# Patient Record
Sex: MALE | URBAN - METROPOLITAN AREA
[De-identification: names, ages, dates, MRNs, and addresses within clinical notes are randomized per-mention and may not be internally consistent; named-entity substitution may affect disease eponyms.]

---

## 2021-04-07 ENCOUNTER — OFFICE VISIT (OUTPATIENT)
Dept: URBAN - METROPOLITAN AREA CLINIC 75 | Facility: CLINIC | Age: 71
End: 2021-04-07
Payer: COMMERCIAL

## 2021-04-07 DIAGNOSIS — H40.213 ACUTE ANGLE-CLOSURE GLAUCOMA, BILATERAL: ICD-10-CM

## 2021-04-07 DIAGNOSIS — H25.813 COMBINED FORMS OF AGE-RELATED CATARACT, BILATERAL: Primary | ICD-10-CM

## 2021-04-07 DIAGNOSIS — H35.033 HYPERTENSIVE RETINOPATHY, BILATERAL: ICD-10-CM

## 2021-04-07 PROCEDURE — 99204 OFFICE O/P NEW MOD 45 MIN: CPT | Performed by: OPTOMETRIST

## 2021-04-07 ASSESSMENT — VISUAL ACUITY
OD: 20/80
OS: 20/150

## 2021-04-07 ASSESSMENT — INTRAOCULAR PRESSURE
OD: 12
OS: 13

## 2021-04-07 NOTE — IMPRESSION/PLAN
Impression: Combined forms of age-related cataract, bilateral: H25.813-Cataracts account for the patient's complaints. Good-moderate dilator. No retinal clearance needed. Plan:   Discussed all risks, benefits, procedures and recovery. Patient understands changing glasses will not improve vision. Patient desires to have surgery.

## 2021-04-07 NOTE — IMPRESSION/PLAN
Impression: Acute angle-closure glaucoma, bilateral: H40.213. Plan: Discussed diagnosis in detail with patient. Will continue to observe condition and or symptoms.

## 2021-05-20 ENCOUNTER — PRE-OPERATIVE VISIT (OUTPATIENT)
Dept: URBAN - METROPOLITAN AREA CLINIC 71 | Facility: CLINIC | Age: 71
End: 2021-05-20
Payer: COMMERCIAL

## 2021-05-20 DIAGNOSIS — H40.033 ANATOMICAL NARROW ANGLE, BILATERAL: ICD-10-CM

## 2021-05-20 DIAGNOSIS — H25.812 COMBINED FORMS OF AGE-RELATED CATARACT, LEFT EYE: ICD-10-CM

## 2021-05-20 PROCEDURE — 92014 COMPRE OPH EXAM EST PT 1/>: CPT | Performed by: OPHTHALMOLOGY

## 2021-05-20 PROCEDURE — 76519 ECHO EXAM OF EYE: CPT | Performed by: OPHTHALMOLOGY

## 2021-05-20 NOTE — IMPRESSION/PLAN
Impression: Combined forms of age-related cataract, bilateral: H25.813. Plan: Discussed diagnosis in detail with patient. Surgical treatment is necessary to improve vision. Patient elects to have surgery. Surgical risks and benefits were discussed, explained and understood by patient. Lid scrubs and hygiene were explained. Pre op instructions given and understood. Post op instructions given and understood. 

PROCEED WITH BASIC TRADITIONAL CATARACTS SURGERY FOR DISTANCE, RX DROPS, CS VIDEO DONE

## 2021-06-15 ENCOUNTER — SURGERY (OUTPATIENT)
Dept: URBAN - METROPOLITAN AREA SURGERY 45 | Facility: SURGERY | Age: 71
End: 2021-06-15
Payer: COMMERCIAL

## 2021-06-15 ENCOUNTER — SURGERY (OUTPATIENT)
Dept: URBAN - METROPOLITAN AREA SURGERY 44 | Facility: SURGERY | Age: 71
End: 2021-06-15
Payer: COMMERCIAL

## 2021-06-15 PROCEDURE — 66984 XCAPSL CTRC RMVL W/O ECP: CPT | Performed by: OPHTHALMOLOGY

## 2021-06-16 ENCOUNTER — POST-OPERATIVE VISIT (OUTPATIENT)
Dept: URBAN - METROPOLITAN AREA CLINIC 71 | Facility: CLINIC | Age: 71
End: 2021-06-16
Payer: COMMERCIAL

## 2021-06-16 DIAGNOSIS — Z48.810 ENCOUNTER FOR SURGICAL AFTERCARE FOLLOWING SURGERY ON A SENSE ORGAN: Primary | ICD-10-CM

## 2021-06-16 PROCEDURE — 99024 POSTOP FOLLOW-UP VISIT: CPT | Performed by: OPHTHALMOLOGY

## 2021-06-16 ASSESSMENT — INTRAOCULAR PRESSURE
OD: 15
OS: 10

## 2021-06-16 NOTE — IMPRESSION/PLAN
Impression: S/P Cataract Extraction by phacoemulsification with IOL placement OD - 1 Day. Encounter for surgical aftercare following surgery on a sense organ  Z48.810. Plan: IOL in place, healing well. Ok to proceed with Left eye, Order for surgery.

## 2021-06-25 ENCOUNTER — SURGERY (OUTPATIENT)
Dept: URBAN - METROPOLITAN AREA SURGERY 44 | Facility: SURGERY | Age: 71
End: 2021-06-25
Payer: COMMERCIAL

## 2021-06-25 PROCEDURE — 66984 XCAPSL CTRC RMVL W/O ECP: CPT | Performed by: OPHTHALMOLOGY

## 2021-06-29 ENCOUNTER — SURGERY (OUTPATIENT)
Dept: URBAN - METROPOLITAN AREA SURGERY 45 | Facility: SURGERY | Age: 71
End: 2021-06-29
Payer: COMMERCIAL

## 2021-06-30 ENCOUNTER — POST-OPERATIVE VISIT (OUTPATIENT)
Dept: URBAN - METROPOLITAN AREA CLINIC 71 | Facility: CLINIC | Age: 71
End: 2021-06-30
Payer: COMMERCIAL

## 2021-06-30 PROCEDURE — 99024 POSTOP FOLLOW-UP VISIT: CPT | Performed by: OPHTHALMOLOGY

## 2021-06-30 ASSESSMENT — INTRAOCULAR PRESSURE
OS: 38
OD: 8
OS: 36

## 2021-06-30 NOTE — IMPRESSION/PLAN
Impression: S/P Cataract Extraction by phacoemulsification with IOL placement OS - 1 Day. Presence of intraocular lens  Z96.1. Plan: IOL in place, healing well. IOP elevated S/P Cataract Extraction OS-38. Wound burped with spud and IOP reduced to7-OS. 2 tablets of Diamox given one to take now and 1 to take this evening. continue the 3 GTTS prescribed postop as directed. Will have pt f/u with Dr Vanessa Lopez tomorrow for IOP check.

## 2021-07-01 ENCOUNTER — POST-OPERATIVE VISIT (OUTPATIENT)
Dept: URBAN - METROPOLITAN AREA CLINIC 75 | Facility: CLINIC | Age: 71
End: 2021-07-01
Payer: COMMERCIAL

## 2021-07-01 PROCEDURE — 99024 POSTOP FOLLOW-UP VISIT: CPT | Performed by: OPTOMETRIST

## 2021-07-01 ASSESSMENT — INTRAOCULAR PRESSURE
OD: 8
OS: 9

## 2021-07-01 NOTE — IMPRESSION/PLAN
Impression: S/P Cataract Extraction by phacoemulsification with IOL placement OS - 2 Days. Presence of intraocular lens  Z96.1. Post operative instructions reviewed - IOP is stable folllowing the use of Diamox. Plan: Recommend artifical tears 2-3x daily to provide comfort and promote healing.

## 2021-07-21 ENCOUNTER — POST-OPERATIVE VISIT (OUTPATIENT)
Dept: URBAN - METROPOLITAN AREA CLINIC 75 | Facility: CLINIC | Age: 71
End: 2021-07-21
Payer: COMMERCIAL

## 2021-07-21 DIAGNOSIS — Z96.1 PRESENCE OF INTRAOCULAR LENS: ICD-10-CM

## 2021-07-21 DIAGNOSIS — H52.4 PRESBYOPIA: Primary | ICD-10-CM

## 2021-07-21 PROCEDURE — 99024 POSTOP FOLLOW-UP VISIT: CPT | Performed by: OPTOMETRIST

## 2021-07-21 ASSESSMENT — INTRAOCULAR PRESSURE
OS: 8
OD: 9

## 2021-07-21 ASSESSMENT — VISUAL ACUITY
OD: 20/20
OS: 20/20

## 2021-07-21 NOTE — IMPRESSION/PLAN
Impression: S/P Cataract Extraction by phacoemulsification with IOL placement OU - 22 Days. Presence of intraocular lens  Z96.1- Refraction completed. Educated patient on findings. Plan: New glasses prescription dispensed. Recommend OTC +2.50 readers. Recommend [employer] Foam for blepharitis.